# Patient Record
Sex: FEMALE | Race: WHITE | NOT HISPANIC OR LATINO | Employment: UNEMPLOYED | URBAN - METROPOLITAN AREA
[De-identification: names, ages, dates, MRNs, and addresses within clinical notes are randomized per-mention and may not be internally consistent; named-entity substitution may affect disease eponyms.]

---

## 2022-01-12 ENCOUNTER — TELEPHONE (OUTPATIENT)
Dept: FAMILY MEDICINE CLINIC | Facility: CLINIC | Age: 37
End: 2022-01-12

## 2022-01-12 NOTE — TELEPHONE ENCOUNTER
Left 2md detailed vm for pt -  A reminder about [employer] PCP change  I advised patient to contact  insurance company to change PCP to Dr Vela -  to [facility]  Advised to get name of person he  / she speaks with just in case we need to contact them back for any reason  Left message earlier :   Patient verbalized understanding and states he will call insurance company right away

## 2022-02-04 NOTE — TELEPHONE ENCOUNTER
Pt cx several appt with our practice - we left several vm messages  for pt to contact health insurance plan  to change PCP assignment - NP appt scheduled 2/16/22 with Dr Vela       Left detailed vm / reminder for pt 2/4/22 @12:06 pm

## 2022-05-24 ENCOUNTER — VBI (OUTPATIENT)
Dept: ADMINISTRATIVE | Facility: OTHER | Age: 37
End: 2022-05-24

## 2022-12-06 ENCOUNTER — OFFICE VISIT (OUTPATIENT)
Dept: FAMILY MEDICINE CLINIC | Facility: CLINIC | Age: 37
End: 2022-12-06

## 2022-12-06 VITALS
SYSTOLIC BLOOD PRESSURE: 122 MMHG | HEART RATE: 96 BPM | TEMPERATURE: 98.8 F | OXYGEN SATURATION: 98 % | DIASTOLIC BLOOD PRESSURE: 76 MMHG | BODY MASS INDEX: 49 KG/M2 | WEIGHT: 287 LBS | HEIGHT: 64 IN

## 2022-12-06 DIAGNOSIS — F90.9 ATTENTION DEFICIT HYPERACTIVITY DISORDER (ADHD), UNSPECIFIED ADHD TYPE: ICD-10-CM

## 2022-12-06 DIAGNOSIS — F31.70 BIPOLAR DISORDER IN FULL REMISSION, MOST RECENT EPISODE UNSPECIFIED TYPE (HCC): ICD-10-CM

## 2022-12-06 DIAGNOSIS — N73.1 CHRONIC PID (CHRONIC PELVIC INFLAMMATORY DISEASE): ICD-10-CM

## 2022-12-06 DIAGNOSIS — N64.4 BREAST PAIN: ICD-10-CM

## 2022-12-06 DIAGNOSIS — N94.9 GYNECOLOGICAL DISORDER: ICD-10-CM

## 2022-12-06 DIAGNOSIS — R63.5 WEIGHT GAIN: ICD-10-CM

## 2022-12-06 DIAGNOSIS — F41.9 ANXIETY AND DEPRESSION: ICD-10-CM

## 2022-12-06 DIAGNOSIS — Z20.5 EXPOSURE TO HEPATITIS C: ICD-10-CM

## 2022-12-06 DIAGNOSIS — F32.A ANXIETY AND DEPRESSION: ICD-10-CM

## 2022-12-06 DIAGNOSIS — R23.4 BREAST SKIN CHANGES: ICD-10-CM
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
## 2022-12-06 DIAGNOSIS — Z80.3 FAMILY HISTORY OF BREAST CANCER IN MOTHER: Primary | ICD-10-CM

## 2022-12-06 RX ORDER — PROMETHAZINE HCL 50 MG
50 TABLET ORAL DAILY
COMMUNITY
Start: 2022-11-16

## 2022-12-06 RX ORDER — ALPRAZOLAM 0.5 MG/1
0.5 TABLET ORAL 2 TIMES DAILY
COMMUNITY
Start: 2022-11-17

## 2022-12-06 RX ORDER — GABAPENTIN 800 MG/1
800 TABLET ORAL 4 TIMES DAILY
COMMUNITY
Start: 2022-11-17

## 2022-12-06 RX ORDER — BUPRENORPHINE AND NALOXONE 8; 2 MG/1; MG/1
8 FILM, SOLUBLE BUCCAL; SUBLINGUAL 2 TIMES DAILY
COMMUNITY
Start: 2022-11-16

## 2022-12-06 RX ORDER — AMOXICILLIN AND CLAVULANATE POTASSIUM 875; 125 MG/1; MG/1
1 TABLET, FILM COATED ORAL EVERY 12 HOURS SCHEDULED
Qty: 14 TABLET | Refills: 0 | Status: SHIPPED | OUTPATIENT
Start: 2022-12-06 | End: 2022-12-13

## 2022-12-06 NOTE — PATIENT INSTRUCTIONS
Calorie Counting Diet   WHAT YOU NEED TO KNOW:   What is a calorie counting diet? It is a meal plan based on counting calories each day to reach a healthy body weight  You will need to eat fewer calories if you are trying to lose weight  Weight loss may decrease your risk for certain health problems or improve your health if you have health problems  Some of these health problems include heart disease, high blood pressure, and diabetes  What foods should I avoid? Your dietitian will tell you if you need to avoid certain foods based on your body weight and health condition  You may need to avoid high-fat foods if you are at risk for or have heart disease  You may need to eat fewer foods from the breads and starches food group if you have diabetes  How many calories are in foods? The following is a list of foods and drinks with the approximate number of calories in each  Check the food label to find the exact number of calories  A dietitian can tell you how many calories you should have from each food group each day  · Carbohydrate:      ? ½ of a 3-inch bagel, 1 slice of bread, or ½ of a hamburger bun or hot dog bun (80)    ? 1 (8-inch) flour tortilla or ½ cup of cooked rice (100)    ? 1 (6-inch) corn tortilla (80)    ? 1 (6-inch) pancake or 1 cup of bran flakes cereal (110)    ? ½ cup of cooked cereal (80)    ? ½ cup of cooked pasta (85)    ? 1 ounce of pretzels (100)    ? 3 cups of air-popped popcorn without butter or oil (80)    · Dairy:      ? 1 cup of skim or 1% milk (90)    ? 1 cup of 2% milk (120)    ? 1 cup of whole milk (160)    ? 1 cup of 2% chocolate milk (220)    ? 1 ounce of low-fat cheese with 3 grams of fat per ounce (70)    ? 1 ounce of cheddar cheese (114)    ? ½ cup of 1% fat cottage cheese (80)    ? 1 cup of plain or sugar-free, fat-free yogurt (90)    · Protein foods:      ? 3 ounces of fish (not breaded or fried) (95)    ? 3 ounces of breaded, fried fish (195)    ?  ¾ cup of tuna canned in water (105)    ? 3 ounces of chicken breast without skin (105)    ? 1 fried chicken breast with skin (350)    ? ¼ cup of fat free egg substitute (40)    ? 1 large egg (75)    ? 3 ounces of lean beef or pork (165)    ? 3 ounces of fried pork chop or ham (185)    ? ½ cup of cooked dried beans, such as kidney, martin, lentils, or navy (115)    ? 3 ounces of bologna or lunch meat (225)    ? 2 links of breakfast sausage (140)    · Vegetables:      ? ½ cup of sliced mushrooms (10)    ? 1 cup of salad greens, such as lettuce, spinach, or tamara (15)    ? ½ cup of steamed asparagus (20)    ? ½ cup of cooked summer squash, zucchini squash, or green or wax beans (25)    ? 1 cup of broccoli or cauliflower florets, or 1 medium tomato (25)    ? 1 large raw carrot or ½ cup of cooked carrots (40)    ? ? of a medium cucumber or 1 stalk of celery (5)    ? 1 small baked potato (160)    ? 1 cup of breaded, fried vegetables (230)    · Fruit:      ? 1 (6-inch) banana (55)     ? ½ of a 4-inch grapefruit (55)    ? 15 grapes (60)    ? 1 medium orange or apple (70)    ? 1 large peach (65)    ? 1 cup of fresh pineapple chunks (75)    ? 1 cup of melon cubes (50)    ? 1¼ cups of whole strawberries (45)    ? ½ cup of fruit canned in juice (55)    ? ½ cup of fruit canned in heavy syrup (110)    ? ? cup of raisins (130)    ? ½ cup of unsweetened fruit juice (60)    ? ½ cup of grape, cranberry, or prune juice (90)    · Fat:      ? 10 peanuts or 2 teaspoons of peanut butter (55)    ? 2 tablespoons of avocado or 1 tablespoon of regular salad dressing (45)    ? 2 slices of loja (90)    ? 1 teaspoon of oil, such as safflower, canola, corn, or olive oil (45)    ? 2 teaspoons of low-fat margarine, or 1 tablespoon of low-fat mayonnaise (50)    ? 1 teaspoon of regular margarine (40)    ? 1 tablespoon of regular mayonnaise (135)    ? 1 tablespoon of cream cheese or 2 tablespoons of low-fat cream cheese (45)    ?  2 tablespoons of vegetable shortening (215)    · Dessert and sweets:      ? 8 animal crackers or 5 vanilla wafers (80)    ? 1 frozen fruit juice bar (80)    ? ½ cup of ice milk or low-fat frozen yogurt (90)    ? ½ cup of sherbet or sorbet (125)    ? ½ cup of sugar-free pudding or custard (60)    ? ½ cup of ice cream (140)    ? ½ cup of pudding or custard (175)    ? 1 (2-inch) square chocolate brownie (185)    · Combination foods:      ? Bean burrito made with an 8-inch tortilla, without cheese (275)    ? Chicken breast sandwich with lettuce and tomato (325)    ? 1 cup of chicken noodle soup (60)    ? 1 beef taco (175)    ? Regular hamburger with lettuce and tomato (310)    ? Regular cheeseburger with lettuce and tomato (410)     ? ¼ of a 12-inch cheese pizza (280)    ? Fried fish sandwich with lettuce and tomato (425)    ? Hot dog and bun (275)    ? 1½ cups of macaroni and cheese (310)    ? Taco salad with a fried tortilla shell (870)    · Low-calorie foods:      ? 1 tablespoon of ketchup or 1 tablespoon of fat free sour cream (15)    ? 1 teaspoon of mustard (5)    ? ¼ cup of salsa (20)    ? 1 large dill pickle (15)    ? 1 tablespoon of fat free salad dressing (10)    ? 2 teaspoons of low-sugar, light jam or jelly, or 1 tablespoon of sugar-free syrup (15)    ? 1 sugar-free popsicle (15)    ? 1 cup of club soda, seltzer water, or diet soda (0)    CARE AGREEMENT:   You have the right to help plan your care  Discuss treatment options with your healthcare provider to decide what care you want to receive  You always have the right to refuse treatment  The above information is an  only  It is not intended as medical advice for individual conditions or treatments  Talk to your doctor, nurse or pharmacist before following any medical regimen to see if it is safe and effective for you  © Copyright [employer] 2022 Information is for End User's use only and may not be sold, redistributed or otherwise used for commercial purposes   All illustrations and images included in CareNotes® are the copyrighted property of A D A M [employer] Inc  or Alexander Johnson

## 2022-12-06 NOTE — PROGRESS NOTES
Assessment/Plan:         Problem List Items Addressed This Visit        Genitourinary    Chronic PID (chronic pelvic inflammatory disease)     Is not interested in seeing a gynecologist at this time         Relevant Medications    amoxicillin-clavulanate (AUGMENTIN) 875-125 mg per tablet    Gynecological disorder       Other    Family history of breast cancer in mother - Primary     We will get a breast ultrasound have her see genetic counselor         Relevant Orders    Ambulatory Referral to Oncology Genetics    US breast screening bilateral complete (ABUS)    Ambulatory Referral to Breast Surgery    Attention deficit hyperactivity disorder (ADHD)     Follows with psych         Relevant Medications    sertraline (ZOLOFT) 50 mg tablet    ALPRAZolam (XANAX) 0 5 mg tablet    Bipolar disorder in full remission ([facility] [address] )     Follows with psych and takes 800 of gabapentin 4x daily         Relevant Medications    sertraline (ZOLOFT) 50 mg tablet    ALPRAZolam (XANAX) 0 5 mg tablet    Anxiety and depression     Follows Dr Machelle Soler office and is stable on Zoloft and Xanax as needed         Relevant Medications    sertraline (ZOLOFT) 50 mg tablet    ALPRAZolam (XANAX) 0 5 mg tablet    Exposure to hepatitis C     States she was weakly positive for follow-up lab work and confirmatory antibody         Relevant Orders    Hepatitis C antibody    HIV 1/2 Antigen/Antibody (4th Generation) w Reflex [facility]    Hepatitis C antibody    BMI 45 0-49 9, adult ([facility] [address] )    Relevant Orders    Comprehensive metabolic panel    CBC and differential    Lipid panel    TSH, 3rd generation with Free T4 reflex    Weight gain   Other Visit Diagnoses     Breast pain        Relevant Orders    US breast screening bilateral complete (ABUS)    Ambulatory Referral to Breast Surgery    Cat bite, initial encounter        Relevant Medications    amoxicillin-clavulanate (AUGMENTIN) 875-125 mg per tablet    Other Relevant Orders    TDAP VACCINE GREATER THAN OR EQUAL TO 6YO IM (Completed)    Breast skin changes        Relevant Orders    Ambulatory Referral to Breast Surgery        Tdap and antibiotic for the cat scratch    Portions of the record may have been created with voice recognition software  Occasional wrong word or "sound a like" substitutions may have occurred due to the inherent limitations of voice recognition software  Read the chart carefully and recognize, using context, where substitutions have occurred  Subjective:      Patient ID: Jd Palumbo is a 40 y o  female  HPI    1 year of bad electric pain in her breast, like they are suffocating R is worse than left (also R handed)  Not related to menstrual cycle  Has noticed some skin changes  No lactation or LOF from nipple    Has hx of endometriosis and pelvic dysfunction, chronic PID, recurrent ovarian cyst rupturing  Denies a hx of STD's  Saw multiple specialist for this without any concrete answers  Had issues with pain and then went on suboxone to help with pain  Does not have sex now because of the pain  Did have some addition concerns with pain meds so transitioned to suboxone  Psych: on xanax and zoloft  Diagnoses with BP type 2 as well  Was scratched by her cat on her right breast and skin has been very red and irritated    The following portions of the patient's history were reviewed and updated as appropriate:   Past Medical History:  She has a past medical history of ADHD, Anxiety, Bipolar 1 disorder ([address] ), Depression, and PTSD (post-traumatic stress disorder)  ,  _______________________________________________________________________  Medical Problems:  does not have any pertinent problems on file ,  _______________________________________________________________________  Past Surgical History:   has no past surgical history on file ,  _______________________________________________________________________  Family History:  family history includes Breast cancer in her cousin and mother; COPD in her mother; Heart attack in her father; Lung cancer in her paternal uncle; Mental illness in her brother; Pancreatic cancer in her maternal aunt  ,  _______________________________________________________________________  Social History:   reports that she has never smoked  She has never used smokeless tobacco  She reports that she does not currently use alcohol  She reports that she does not currently use drugs  ,  _______________________________________________________________________  Allergies:  is allergic to doxycycline and penicillin v   _______________________________________________________________________  Current Outpatient Medications   Medication Sig Dispense Refill   • amoxicillin-clavulanate (AUGMENTIN) 875-125 mg per tablet Take 1 tablet by mouth every 12 (twelve) hours for 7 days 14 tablet 0   • ALPRAZolam (XANAX) 0 5 mg tablet Take 0 5 mg by mouth 2 (two) times a day     • buprenorphine-naloxone (Suboxone) 8-2 mg Place 8 mg under the tongue 2 (two) times a day     • gabapentin (NEURONTIN) 800 mg tablet Take 800 mg by mouth 4 (four) times a day     • promethazine (PHENERGAN) 50 MG tablet Take 50 mg by mouth in the morning     • sertraline (ZOLOFT) 50 mg tablet Take 50 mg by mouth in the morning       No current facility-administered medications for this visit      _______________________________________________________________________  Review of Systems   Constitutional: Positive for unexpected weight change  Negative for chills, fatigue and fever  HENT: Negative for rhinorrhea and sore throat  Eyes: Negative for visual disturbance  Respiratory: Negative for cough and shortness of breath  Cardiovascular: Positive for chest pain  Negative for palpitations  Gastrointestinal: Negative for abdominal pain, constipation, diarrhea, nausea and vomiting  Genitourinary: Positive for pelvic pain  Negative for difficulty urinating, dysuria and frequency     Musculoskeletal: Negative for arthralgias and myalgias  Skin: Positive for wound  Negative for color change and rash  Neurological: Negative for weakness and headaches  Psychiatric/Behavioral: Positive for behavioral problems  Objective:  Vitals:    12/06/22 0902   BP: 122/76   BP Location: Left arm   Patient Position: Sitting   Cuff Size: Standard   Pulse: 96   Temp: 98 8 °F (37 1 °C)   SpO2: 98%   Weight: 130 kg (287 lb)   Height: 5' 4" (1 626 m)     Body mass index is 49 26 kg/m²  Physical Exam  Constitutional:       General: She is not in acute distress  Appearance: Normal appearance  She is obese  She is not ill-appearing  HENT:      Head: Normocephalic and atraumatic  Right Ear: External ear normal       Left Ear: External ear normal       Nose: Nose normal  No congestion or rhinorrhea  Mouth/Throat:      Mouth: Mucous membranes are moist       Pharynx: Oropharynx is clear  No oropharyngeal exudate or posterior oropharyngeal erythema  Eyes:      Extraocular Movements: Extraocular movements intact  Conjunctiva/sclera: Conjunctivae normal       Pupils: Pupils are equal, round, and reactive to light  Cardiovascular:      Rate and Rhythm: Normal rate and regular rhythm  Pulses: Normal pulses  Heart sounds: No murmur heard  Pulmonary:      Effort: Pulmonary effort is normal  No respiratory distress  Breath sounds: Normal breath sounds  No wheezing  Chest:      Chest wall: No tenderness  Breasts:     Right: Skin change present  No swelling, bleeding, inverted nipple, mass, nipple discharge or tenderness  Left: No bleeding, inverted nipple, mass, nipple discharge, skin change or tenderness  Comments: Glandular changes in both breast, in R breast at 4 oclock position with nipple  In left breast in the 9 oclock position  Abdominal:      General: Bowel sounds are normal       Palpations: Abdomen is soft  Tenderness: There is no abdominal tenderness     Musculoskeletal: General: Normal range of motion  Cervical back: Normal range of motion  Lymphadenopathy:      Upper Body:      Right upper body: No supraclavicular, axillary or pectoral adenopathy  Left upper body: No supraclavicular, axillary or pectoral adenopathy  Skin:     General: Skin is warm and dry  Capillary Refill: Capillary refill takes less than 2 seconds  Findings: No rash  Neurological:      General: No focal deficit present  Mental Status: She is alert  Mental status is at baseline  Depression Screening Follow-up Plan: Patient's depression screening was positive with a PHQ-2 score of 5  Their PHQ-9 score was 19  Continue regular follow-up with their psychologist/therapist/psychiatrist who is managing their mental health condition(s)  BMI Counseling: Body mass index is 49 26 kg/m²  The BMI is above normal  Nutrition recommendations include reducing portion sizes, 3-5 servings of fruits/vegetables daily, consuming healthier snacks, moderation in carbohydrate intake and reducing intake of cholesterol  Exercise recommendations include moderate aerobic physical activity for 150 minutes/week and exercising 3-5 times per week

## 2022-12-08 ENCOUNTER — TELEPHONE (OUTPATIENT)
Dept: GENETICS | Facility: CLINIC | Age: 37
End: 2022-12-08

## 2022-12-08 NOTE — TELEPHONE ENCOUNTER
Kendy Miller called back to schedule a new patient appointment with the Cancer Risk and Genetics Program       Outcome:  Genetics appointment scheduled for April 18, 2023  Patient was added to the wait list    Personal/Family History Related to Appointment:  No Personal hx of cancer  Family hx of breast ca(mom), lung ca(p uncle) pancreatic ca(m aunt), skin ca(m uncle)  Patient is non-Yazidism  History of Genetic Testing:  Patient reports family history of genetic testing  BRCA testing (mom) negative  Unsure if able to bring copy of results to appointment  Genetics Family History Questionnaire: The patient provided an alternative e-mail to send an invite link for our genetics family history intake: [email or web address]

## 2022-12-08 NOTE — TELEPHONE ENCOUNTER
I called Moris Morgan to schedule a new patient appointment with the Cancer Risk and Genetics Program       Outcome:   I left a voice message encouraging the patient to call the genetics team at [phone] to schedule this appointment  Follow-up:   At this time the referral will be closed and we will wait to hear back from the patient regarding scheduling this appointment

## 2022-12-21 ENCOUNTER — VBI (OUTPATIENT)
Dept: ADMINISTRATIVE | Facility: OTHER | Age: 37
End: 2022-12-21

## 2023-01-11 ENCOUNTER — TELEPHONE (OUTPATIENT)
Dept: PSYCHIATRY | Facility: CLINIC | Age: 38
End: 2023-01-11

## 2023-01-11 NOTE — TELEPHONE ENCOUNTER
Gurdeepbritt Briseyda  requested a call back to discuss Pt stated she used to be a pt of the provider and wants to speak with her in regards to her current medication shes on for 13 years  Pt stated they want to take her off of it and shes scared  She was a former pt  They can be reached at P# [phone] or [phone]      Thank you

## 2023-01-12 ENCOUNTER — TELEPHONE (OUTPATIENT)
Dept: PSYCHIATRY | Facility: CLINIC | Age: 38
End: 2023-01-12

## 2023-01-12 NOTE — TELEPHONE ENCOUNTER
Ok Thank you! I was not sure if you would be able to speak with her even if she was a previous pt   I will call her and have her go through intake and be placed on the wait list

## 2023-01-12 NOTE — TELEPHONE ENCOUNTER
Pt called in yesterday wanting to speak with a previous provider  The provider declined to speak with the pt due to time gap etc  Writer tried to call the pt back but had to leave a message for her to call the office and be placed on the wait list if shes interested in services

## 2023-01-19 ENCOUNTER — DOCUMENTATION (OUTPATIENT)
Dept: GENETICS | Facility: CLINIC | Age: 38
End: 2023-01-19

## 2023-06-20 ENCOUNTER — TELEPHONE (OUTPATIENT)
Dept: OBGYN CLINIC | Facility: HOSPITAL | Age: 38
End: 2023-06-20

## 2023-06-20 NOTE — TELEPHONE ENCOUNTER
I received a Care Request from patient to schedule for:  Where Does it Hurt? Left knee front bone and swelling and pain on the sides and top front   Are you considering joint replacement? Yes   Are you seeking a second opinion? No  If yes, who is your doctor? Dr Kalina Shen         I lvm to [address] [address] at [phone]

## 2023-08-22 ENCOUNTER — VBI (OUTPATIENT)
Dept: ADMINISTRATIVE | Facility: OTHER | Age: 38
End: 2023-08-22

## 2024-03-26 ENCOUNTER — TELEPHONE (OUTPATIENT)
Dept: FAMILY MEDICINE CLINIC | Facility: CLINIC | Age: 39
End: 2024-03-26

## 2024-03-26 NOTE — TELEPHONE ENCOUNTER
Pt scheduled for a sick visit on Friday and doesn't have a car. Wondering if visit can be virtual.     Please advise

## 2024-03-29 ENCOUNTER — TELEMEDICINE (OUTPATIENT)
Dept: FAMILY MEDICINE CLINIC | Facility: CLINIC | Age: 39
End: 2024-03-29
Payer: COMMERCIAL

## 2024-03-29 VITALS — WEIGHT: 262 LBS | HEIGHT: 64 IN | BODY MASS INDEX: 44.73 KG/M2

## 2024-03-29 DIAGNOSIS — N63.0 BREAST LUMP IN FEMALE: ICD-10-CM

## 2024-03-29 DIAGNOSIS — J06.9 UPPER RESPIRATORY TRACT INFECTION, UNSPECIFIED TYPE: Primary | ICD-10-CM

## 2024-03-29 DIAGNOSIS — N63.0 FIBROUS BREAST LUMPS: ICD-10-CM

## 2024-03-29 DIAGNOSIS — Z20.5 EXPOSURE TO HEPATITIS C: ICD-10-CM

## 2024-03-29 DIAGNOSIS — R63.4 WEIGHT LOSS: ICD-10-CM

## 2024-03-29 PROCEDURE — 99214 OFFICE O/P EST MOD 30 MIN: CPT | Performed by: STUDENT IN AN ORGANIZED HEALTH CARE EDUCATION/TRAINING PROGRAM

## 2024-03-29 RX ORDER — AZITHROMYCIN 250 MG/1
TABLET, FILM COATED ORAL
Qty: 6 TABLET | Refills: 0 | Status: SHIPPED | OUTPATIENT
Start: 2024-03-29 | End: 2024-04-02

## 2024-03-29 RX ORDER — LISDEXAMFETAMINE DIMESYLATE 40 MG/1
40 CAPSULE ORAL EVERY MORNING
COMMUNITY
Start: 2024-03-13

## 2024-03-29 NOTE — PROGRESS NOTES
"Virtual Regular Visit    Verification of patient location:    Patient is located at Home in the following state in which I hold an active license [place]      Assessment/Plan:    Problem List Items Addressed This Visit        Other    Exposure to hepatitis C    Relevant Orders    Hepatitis C antibody    Hepatitis C RNA, Quantitative PCR, [facility]   Other Visit Diagnoses     Upper respiratory tract infection, unspecified type    -  Primary    Relevant Medications    azithromycin (ZITHROMAX) 250 mg tablet    methylPREDNISolone (MEDROL) 2 MG tablet    Weight loss        Relevant Orders    Hepatitis C antibody    HIV 1/2 AG/AB w Reflex [facility] for 2 yr old and above    Comprehensive metabolic panel    Hemoglobin A1C    TSH + Free T4    CBC (Includes Diff/Plt) (Refl)    Fibrous breast lumps        Breast lump in female        Relevant Orders    US breast left limited (diagnostic)    US breast right limited (diagnostic)                 Reason for visit is   Chief Complaint   Patient presents with   • URI     Onset 2 weeks. Sore throat, congestion, headache, eye swelling. Ear pain- with \"yellow stuff coming out\"    • Virtual Regular Visit          Encounter provider Kenzie Vela MD    Provider located at [address] [place] [address]  [phone]      Recent Visits  Date Type Provider Dept   03/26/24 Telephone Kenzie Vela MD [address]   Showing recent visits within past 7 days and meeting all other requirements  Today's Visits  Date Type Provider Dept   03/29/24 Telemedicine Kenzie Vela MD [address]   Showing today's visits and meeting all other requirements  Future Appointments  No visits were found meeting these conditions.  Showing future appointments within next 150 days and meeting all other requirements       The patient was identified by name and date of " birth. Mariana Messina was informed that this is a telemedicine visit and that the visit is being conducted through the Epic Embedded platform. She agrees to proceed..  My office door was closed. No one else was in the room.  She acknowledged consent and understanding of privacy and security of the video platform. The patient has agreed to participate and understands they can discontinue the visit at any time.    Patient is aware this is a billable service.     Subjective  Mariana Messina is a 38 y.o. female       HPI     Uri symptoms, eyes, ears for  a few weeks. Mom has been sick, brothger had flu.     Concerned about breast lumps, did not have mammogram or US (dneied by insurance per patient)    Concerned about weight loss, was told she may have hep c in the past. Ex partner had hep c. She never had treatment    Past Medical History:   Diagnosis Date   • ADHD    • Anxiety    • Bipolar 1 disorder (HCC)    • Depression    • PTSD (post-traumatic stress disorder)        History reviewed. No pertinent surgical history.    Current Outpatient Medications   Medication Sig Dispense Refill   • ALPRAZolam (XANAX) 0.5 mg tablet Take 0.5 mg by mouth 2 (two) times a day     • azithromycin (ZITHROMAX) 250 mg tablet Take 2 tablets today then 1 tablet daily x 4 days 6 tablet 0   • buprenorphine-naloxone (Suboxone) 8-2 mg Place 8 mg under the tongue 2 (two) times a day     • gabapentin (NEURONTIN) 800 mg tablet Take 800 mg by mouth 4 (four) times a day     • methylPREDNISolone (MEDROL) 2 MG tablet Take 1 tablet (2 mg total) by mouth daily for 5 days 10 tablet 0   • promethazine (PHENERGAN) 50 MG tablet Take 50 mg by mouth in the morning     • sertraline (ZOLOFT) 50 mg tablet Take 50 mg by mouth in the morning     • Vyvanse 40 MG capsule Take 40 mg by mouth every morning       No current facility-administered medications for this visit.        Allergies   Allergen Reactions   • Codeine Hives   • Doxycycline Hives   • Penicillin V Cough  "      Review of Systems   Constitutional:  Positive for activity change, appetite change, fatigue and unexpected weight change. Negative for fever.   HENT:  Positive for rhinorrhea and sore throat. Negative for congestion.    Eyes:  Negative for visual disturbance.   Respiratory:  Positive for cough. Negative for shortness of breath.    Cardiovascular:  Negative for chest pain.   Gastrointestinal:  Negative for nausea and vomiting.       Video Exam    Vitals:    03/29/24 1303   Weight: 119 kg (262 lb)   Height: 5' 4\" (1.626 m)       Physical Exam  Constitutional:       General: She is not in acute distress.     Appearance: Normal appearance. She is not ill-appearing.   Musculoskeletal:      Cervical back: Normal range of motion.   Neurological:      Mental Status: She is alert.          Visit Time  Total Visit Duration: 15        "

## 2024-04-18 ENCOUNTER — VBI (OUTPATIENT)
Dept: ADMINISTRATIVE | Facility: OTHER | Age: 39
End: 2024-04-18

## 2024-04-20 ENCOUNTER — APPOINTMENT (EMERGENCY)
Dept: CT IMAGING | Facility: HOSPITAL | Age: 39
End: 2024-04-20
Payer: COMMERCIAL

## 2024-04-20 ENCOUNTER — HOSPITAL ENCOUNTER (EMERGENCY)
Facility: HOSPITAL | Age: 39
Discharge: HOME | End: 2024-04-21
Attending: EMERGENCY MEDICINE
Payer: COMMERCIAL

## 2024-04-20 VITALS
DIASTOLIC BLOOD PRESSURE: 85 MMHG | RESPIRATION RATE: 20 BRPM | TEMPERATURE: 98.2 F | SYSTOLIC BLOOD PRESSURE: 141 MMHG | OXYGEN SATURATION: 99 % | HEART RATE: 87 BPM

## 2024-04-20 DIAGNOSIS — N20.0 NEPHROLITHIASIS: Primary | ICD-10-CM

## 2024-04-20 LAB
ALBUMIN SERPL BCP-MCNC: 4.1 G/DL (ref 3.5–5)
ALP SERPL-CCNC: 96 U/L (ref 34–104)
ALT SERPL W P-5'-P-CCNC: 32 U/L (ref 7–52)
ANION GAP SERPL CALCULATED.3IONS-SCNC: 8 MMOL/L (ref 4–13)
AST SERPL W P-5'-P-CCNC: 45 U/L (ref 13–39)
BACTERIA UR QL AUTO: ABNORMAL /HPF
BASOPHILS # BLD AUTO: 0.03 THOUSANDS/ÂΜL (ref 0–0.1)
BASOPHILS NFR BLD AUTO: 0 % (ref 0–1)
BILIRUB SERPL-MCNC: 0.65 MG/DL (ref 0.2–1)
BILIRUB UR QL STRIP: ABNORMAL
BUN SERPL-MCNC: 10 MG/DL (ref 5–25)
CALCIUM SERPL-MCNC: 9.7 MG/DL (ref 8.4–10.2)
CAOX CRY URNS QL MICRO: ABNORMAL /HPF
CHLORIDE SERPL-SCNC: 101 MMOL/L (ref 96–108)
CLARITY UR: ABNORMAL
CO2 SERPL-SCNC: 28 MMOL/L (ref 21–32)
COLOR UR: ABNORMAL
CREAT SERPL-MCNC: 0.78 MG/DL (ref 0.6–1.3)
EOSINOPHIL # BLD AUTO: 0.14 THOUSAND/ÂΜL (ref 0–0.61)
EOSINOPHIL NFR BLD AUTO: 2 % (ref 0–6)
ERYTHROCYTE [DISTWIDTH] IN BLOOD BY AUTOMATED COUNT: 12.8 % (ref 11.6–15.1)
EXT PREGNANCY TEST URINE: NEGATIVE
EXT. CONTROL: NORMAL
GFR SERPL CREATININE-BSD FRML MDRD: 96 ML/MIN/1.73SQ M
GLUCOSE SERPL-MCNC: 80 MG/DL (ref 65–140)
GLUCOSE UR STRIP-MCNC: NEGATIVE MG/DL
HCT VFR BLD AUTO: 44.9 % (ref 34.8–46.1)
HGB BLD-MCNC: 15.5 G/DL (ref 11.5–15.4)
HGB UR QL STRIP.AUTO: ABNORMAL
IMM GRANULOCYTES # BLD AUTO: 0.01 THOUSAND/UL (ref 0–0.2)
IMM GRANULOCYTES NFR BLD AUTO: 0 % (ref 0–2)
KETONES UR STRIP-MCNC: NEGATIVE MG/DL
LEUKOCYTE ESTERASE UR QL STRIP: NEGATIVE
LIPASE SERPL-CCNC: 7 U/L (ref 11–82)
LYMPHOCYTES # BLD AUTO: 2.11 THOUSANDS/ÂΜL (ref 0.6–4.47)
LYMPHOCYTES NFR BLD AUTO: 29 % (ref 14–44)
MCH RBC QN AUTO: 30.5 PG (ref 26.8–34.3)
MCHC RBC AUTO-ENTMCNC: 34.5 G/DL (ref 31.4–37.4)
MCV RBC AUTO: 88 FL (ref 82–98)
MONOCYTES # BLD AUTO: 0.41 THOUSAND/ÂΜL (ref 0.17–1.22)
MONOCYTES NFR BLD AUTO: 6 % (ref 4–12)
NEUTROPHILS # BLD AUTO: 4.64 THOUSANDS/ÂΜL (ref 1.85–7.62)
NEUTS SEG NFR BLD AUTO: 63 % (ref 43–75)
NITRITE UR QL STRIP: NEGATIVE
NON-SQ EPI CELLS URNS QL MICRO: ABNORMAL /HPF
NRBC BLD AUTO-RTO: 0 /100 WBCS
PH UR STRIP.AUTO: 5.5 [PH]
PLATELET # BLD AUTO: 227 THOUSANDS/UL (ref 149–390)
PMV BLD AUTO: 10.8 FL (ref 8.9–12.7)
POTASSIUM SERPL-SCNC: 4 MMOL/L (ref 3.5–5.3)
PROT SERPL-MCNC: 7.9 G/DL (ref 6.4–8.4)
PROT UR STRIP-MCNC: ABNORMAL MG/DL
RBC # BLD AUTO: 5.09 MILLION/UL (ref 3.81–5.12)
RBC #/AREA URNS AUTO: ABNORMAL /HPF
SODIUM SERPL-SCNC: 137 MMOL/L (ref 135–147)
SP GR UR STRIP.AUTO: >=1.03 (ref 1–1.03)
UROBILINOGEN UR QL STRIP.AUTO: 0.2 E.U./DL
WBC # BLD AUTO: 7.34 THOUSAND/UL (ref 4.31–10.16)
WBC #/AREA URNS AUTO: ABNORMAL /HPF

## 2024-04-20 PROCEDURE — 99284 EMERGENCY DEPT VISIT MOD MDM: CPT

## 2024-04-20 PROCEDURE — 74177 CT ABD & PELVIS W/CONTRAST: CPT

## 2024-04-20 PROCEDURE — 80053 COMPREHEN METABOLIC PANEL: CPT

## 2024-04-20 PROCEDURE — 36415 COLL VENOUS BLD VENIPUNCTURE: CPT

## 2024-04-20 PROCEDURE — 96374 THER/PROPH/DIAG INJ IV PUSH: CPT

## 2024-04-20 PROCEDURE — 81001 URINALYSIS AUTO W/SCOPE: CPT

## 2024-04-20 PROCEDURE — 81025 URINE PREGNANCY TEST: CPT

## 2024-04-20 PROCEDURE — 83690 ASSAY OF LIPASE: CPT

## 2024-04-20 PROCEDURE — 85025 COMPLETE CBC W/AUTO DIFF WBC: CPT

## 2024-04-20 PROCEDURE — 96361 HYDRATE IV INFUSION ADD-ON: CPT

## 2024-04-20 RX ORDER — KETOROLAC TROMETHAMINE 30 MG/ML
15 INJECTION, SOLUTION INTRAMUSCULAR; INTRAVENOUS ONCE
Status: COMPLETED | OUTPATIENT
Start: 2024-04-20 | End: 2024-04-20

## 2024-04-20 RX ADMIN — KETOROLAC TROMETHAMINE 15 MG: 30 INJECTION, SOLUTION INTRAMUSCULAR; INTRAVENOUS at 22:57

## 2024-04-20 RX ADMIN — SODIUM CHLORIDE 1000 ML: 0.9 INJECTION, SOLUTION INTRAVENOUS at 22:40

## 2024-04-21 RX ORDER — IBUPROFEN 800 MG/1
800 TABLET ORAL 3 TIMES DAILY
Qty: 21 TABLET | Refills: 0 | Status: SHIPPED | OUTPATIENT
Start: 2024-04-21 | End: 2024-04-23

## 2024-04-21 RX ORDER — TAMSULOSIN HYDROCHLORIDE 0.4 MG/1
0.4 CAPSULE ORAL
Qty: 5 CAPSULE | Refills: 0 | Status: SHIPPED | OUTPATIENT
Start: 2024-04-21 | End: 2024-04-23 | Stop reason: SDUPTHER

## 2024-04-21 RX ADMIN — IOHEXOL 100 ML: 350 INJECTION, SOLUTION INTRAVENOUS at 00:07

## 2024-04-21 NOTE — DISCHARGE INSTRUCTIONS
Follow-up with urology.  Take medicine as directed.  If any symptoms worsen or new symptoms appear return to the ER.

## 2024-04-21 NOTE — ED PROVIDER NOTES
History  Chief Complaint   Patient presents with    Flank Pain     Pt started with bad L flank and lower abd pain that started 2 days ago. Pt has a hx of ovarian cysts and states that this feels a lot like that. Pt also complains of constipation and dark urine for the past couple weeks     The patient is a 38 y.o. female with a history of ADHD, anxiety, bipolar 1, depression, PTSD, interstitial cystitis who presents to [facility] with a chief complaint of left flank pain. Symptoms began 2 days ago and have been progressively worsening since onset. Her pain is currently rated as a 7/10 in severity and described as sharp with radiation around the lower abdomen. Associated symptoms include dark urine and nasuea. Symptoms are aggravated with none noted and alleviating factors include none noted. The patient denies fever, chills, night sweats, chest pain, shortness of breath, falls, trauma, diarrhea, constipation, syncope. No other reported symptoms at this time.  Patient affirms allergies to codeine, doxycycline, penicillin V  Patient reports that she has been on Suboxone for around 10 years.  Patient also endorses a history of kidney stones.          History provided by:  Patient   used: No    Flank Pain  Associated symptoms: no chest pain, no chills, no cough, no dysuria, no fever, no hematuria, no shortness of breath, no sore throat and no vomiting        Prior to Admission Medications   Prescriptions Last Dose Informant Patient Reported? Taking?   ALPRAZolam (XANAX) 0.5 mg tablet   Yes No   Sig: Take 0.5 mg by mouth 2 (two) times a day   Vyvanse 40 MG capsule   Yes No   Sig: Take 40 mg by mouth every morning   buprenorphine-naloxone (Suboxone) 8-2 mg   Yes No   Sig: Place 8 mg under the tongue 2 (two) times a day   gabapentin (NEURONTIN) 800 mg tablet   Yes No   Sig: Take 800 mg by mouth 4 (four) times a day   promethazine (PHENERGAN) 50 MG tablet   Yes No   Sig: Take 50 mg by  mouth in the morning   sertraline (ZOLOFT) 50 mg tablet   Yes No   Sig: Take 50 mg by mouth in the morning      Facility-Administered Medications: None       Past Medical History:   Diagnosis Date    ADHD     Anxiety     Bipolar 1 disorder (HCC)     Depression     PTSD (post-traumatic stress disorder)        History reviewed. No pertinent surgical history.    Family History   Problem Relation Age of Onset    Breast cancer Mother     COPD Mother     Heart attack Father     Mental illness Brother     Pancreatic cancer Maternal Aunt     Lung cancer Paternal Uncle     Breast cancer Cousin      I have reviewed and agree with the history as documented.    E-Cigarette/Vaping    E-Cigarette Use Current Every Day User      E-Cigarette/Vaping Substances    Nicotine Yes     THC No     CBD No     Flavoring No     Other No     Unknown No      Social History     Tobacco Use    Smoking status: Never    Smokeless tobacco: Never   Vaping Use    Vaping status: Every Day    Substances: Nicotine   Substance Use Topics    Alcohol use: Not Currently    Drug use: Not Currently       Review of Systems   Constitutional:  Negative for chills and fever.   HENT:  Negative for ear pain and sore throat.    Eyes:  Negative for pain and visual disturbance.   Respiratory:  Negative for cough and shortness of breath.    Cardiovascular:  Negative for chest pain and palpitations.   Gastrointestinal:  Negative for abdominal pain and vomiting.   Genitourinary:  Positive for difficulty urinating and flank pain. Negative for dysuria and hematuria.   Musculoskeletal:  Negative for arthralgias and back pain.   Skin:  Negative for color change and rash.   Neurological:  Negative for seizures and syncope.   All other systems reviewed and are negative.      Physical Exam  Physical Exam  Vitals reviewed.   Constitutional:       General: She is not in acute distress.     Appearance: Normal appearance. She is not ill-appearing.   HENT:      Head: Normocephalic  and atraumatic.   Cardiovascular:      Rate and Rhythm: Normal rate.      Pulses: Normal pulses.   Pulmonary:      Effort: Pulmonary effort is normal. No respiratory distress.      Breath sounds: No stridor. No wheezing or rhonchi.   Abdominal:      General: Abdomen is protuberant. Bowel sounds are normal.      Palpations: Abdomen is soft.      Tenderness: There is abdominal tenderness in the left lower quadrant. There is left CVA tenderness. There is no right CVA tenderness, guarding or rebound. Negative signs include Beckman's sign, Rovsing's sign, McBurney's sign and psoas sign.   Skin:     General: Skin is warm and dry.      Capillary Refill: Capillary refill takes less than 2 seconds.      Coloration: Skin is not jaundiced.   Neurological:      Mental Status: She is alert and oriented to person, place, and time. Mental status is at baseline.         Vital Signs  ED Triage Vitals   Temperature Pulse Respirations Blood Pressure SpO2   04/20/24 2215 04/20/24 2210 04/20/24 2210 04/20/24 2210 04/20/24 2210   98.2 °F (36.8 °C) 87 20 141/85 99 %      Temp Source Heart Rate Source Patient Position - Orthostatic VS BP Location FiO2 (%)   04/20/24 2215 04/20/24 2210 04/20/24 2210 04/20/24 2210 --   Oral Monitor Sitting Left arm       Pain Score       04/20/24 2210       10 - Worst Possible Pain           Vitals:    04/20/24 2210   BP: 141/85   Pulse: 87   Patient Position - Orthostatic VS: Sitting         Visual Acuity      ED Medications  Medications   sodium chloride 0.9 % bolus 1,000 mL (0 mL Intravenous Stopped 4/21/24 0009)   ketorolac (TORADOL) injection 15 mg (15 mg Intravenous Given 4/20/24 2257)   iohexol (OMNIPAQUE) 350 MG/ML injection (MULTI-DOSE) 100 mL (100 mL Intravenous Given 4/21/24 0007)       Diagnostic Studies  Results Reviewed       Procedure Component Value Units Date/Time    Comprehensive metabolic panel [[id number]]  (Abnormal) Collected: 04/20/24 2238    Lab Status: Final result Specimen: Blood from  Arm, Right Updated: 04/20/24 2342     Sodium 137 mmol/L      Potassium 4.0 mmol/L      Chloride 101 mmol/L      CO2 28 mmol/L      ANION GAP 8 mmol/L      BUN 10 mg/dL      Creatinine 0.78 mg/dL      Glucose 80 mg/dL      Calcium 9.7 mg/dL      AST 45 U/L      ALT 32 U/L      Alkaline Phosphatase 96 U/L      Total Protein 7.9 g/dL      Albumin 4.1 g/dL      Total Bilirubin 0.65 mg/dL      eGFR 96 ml/min/1.73sq m     Narrative:      National Kidney Disease Foundation guidelines for Chronic Kidney Disease (CKD):     Stage 1 with normal or high GFR (GFR > 90 mL/min/1.73 square meters)    Stage 2 Mild CKD (GFR = 60-89 mL/min/1.73 square meters)    Stage 3A Moderate CKD (GFR = 45-59 mL/min/1.73 square meters)    Stage 3B Moderate CKD (GFR = 30-44 mL/min/1.73 square meters)    Stage 4 Severe CKD (GFR = 15-29 mL/min/1.73 square meters)    Stage 5 End Stage CKD (GFR <15 mL/min/1.73 square meters)  Note: GFR calculation is accurate only with a steady state creatinine    Lipase [[id number]]  (Abnormal) Collected: 04/20/24 2238    Lab Status: Final result Specimen: Blood from Arm, Right Updated: 04/20/24 2342     Lipase 7 u/L     Urine Microscopic [[id number]]  (Abnormal) Collected: 04/20/24 2239    Lab Status: Final result Specimen: Urine, Other Updated: 04/20/24 2316     RBC, UA 30-50 /hpf      WBC, UA 2-4 /hpf      Epithelial Cells Moderate /hpf      Bacteria, UA Occasional /hpf      Ca Oxalate Stephanie, UA Occasional /hpf     UA w Reflex to Microscopic w Reflex to Culture [[id number]]  (Abnormal) Collected: 04/20/24 2239    Lab Status: Final result Specimen: Urine, Other Updated: 04/20/24 2315     Color, UA Brown     Clarity, UA Cloudy     Specific Gravity, UA >=1.030     pH, UA 5.5     Leukocytes, UA Negative     Nitrite, UA Negative     Protein,  (2+) mg/dl      Glucose, UA Negative mg/dl      Ketones, UA Negative mg/dl      Urobilinogen, UA 0.2 E.U./dl      Bilirubin, UA Small     Occult Blood, UA Large    CBC and  differential [[id number]]  (Abnormal) Collected: 04/20/24 2238    Lab Status: Final result Specimen: Blood from Arm, Right Updated: 04/20/24 2305     WBC 7.34 Thousand/uL      RBC 5.09 Million/uL      Hemoglobin 15.5 g/dL      Hematocrit 44.9 %      MCV 88 fL      MCH 30.5 pg      MCHC 34.5 g/dL      RDW 12.8 %      MPV 10.8 fL      Platelets 227 Thousands/uL      nRBC 0 /100 WBCs      Segmented % 63 %      Immature Grans % 0 %      Lymphocytes % 29 %      Monocytes % 6 %      Eosinophils Relative 2 %      Basophils Relative 0 %      Absolute Neutrophils 4.64 Thousands/µL      Absolute Immature Grans 0.01 Thousand/uL      Absolute Lymphocytes 2.11 Thousands/µL      Absolute Monocytes 0.41 Thousand/µL      Eosinophils Absolute 0.14 Thousand/µL      Basophils Absolute 0.03 Thousands/µL     POCT pregnancy, urine [[id number]]  (Normal) Resulted: 04/20/24 2252    Lab Status: Final result Updated: 04/20/24 2252     EXT Preg Test, Ur Negative     Control Valid                   CT abdomen pelvis with contrast    (Results Pending)              Procedures  Procedures         ED Course  ED Course as of 04/21/24 0632   Sat Apr 20, 2024 2252 PREGNANCY TEST URINE: Negative   Sun Apr 21, 2024   0108 CT abdomen pelvis with contrast                               SBIRT 22yo+      Flowsheet Row Most Recent Value   Initial Alcohol Screen: US AUDIT-C     1. How often do you have a drink containing alcohol? 0 Filed at: 04/20/2024 2212   2. How many drinks containing alcohol do you have on a typical day you are drinking?  0 Filed at: 04/20/2024 2212   3b. FEMALE Any Age, or MALE 65+: How often do you have 4 or more drinks on one occassion? 0 Filed at: 04/20/2024 2212   Audit-C Score 0 Filed at: 04/20/2024 2212   RAMIRO: How many times in the past year have you...    Used an illegal drug or used a prescription medication for non-medical reasons? Never Filed at: 04/20/2024 2212                      Medical Decision Making  Patient presents  with flank pain likely secondary to renal colic from likely non infected kidney stone. Given history, exam, and work up I have low suspicion for atypical appendicitis, genital torsion, acute cholecystitis, AAA, infected obstructed stone, pyelonephritis, or other emergent intraabdominal pathology. Symptoms and UA indicate no infection. BMP witohut evidence of RANI. Pain treated in ED with toradol. Discussed case with urology who recommended pain medicine and tamsulosin with expedited follow up. Patient appropriate for discharge with outpatient follow-up and ibuprofen for pain. Prior to discharge, discharge instructions were discussed with patient at bedside. Patient was provided both verbal and written instructions. Patient is understanding of the discharge instructions and is agreeable to plan of care. Return precautions were discussed with patient bedside, patient verbalized understanding of signs and symptoms that would necessitate return to the ED. All questions were answered. Patient was comfortable with the plan of care and discharged to home.       Problems Addressed:  Nephrolithiasis: acute illness or injury    Amount and/or Complexity of Data Reviewed  Labs: ordered. Decision-making details documented in ED Course.  Radiology: ordered. Decision-making details documented in ED Course.    Risk  Prescription drug management.             Disposition  Final diagnoses:   Nephrolithiasis     Time reflects when diagnosis was documented in both MDM as applicable and the Disposition within this note       Time User Action Codes Description Comment    4/21/2024  2:13 AM Donnie Agee Add [N20.0] Nephrolithiasis           ED Disposition       ED Disposition   Discharge    Condition   Stable    Date/Time   Sun Apr 21, 2024 0214    Comment   Mariana Messina discharge to home/self care.                   Follow-up Information       Follow up With Specialties Details Why Contact Info Additional Information    Kenzie Vela,  MD Family Medicine Schedule an appointment as soon as possible for a visit   [address] [place] [address]  [address]  [place] [address]  [phone]       [facility] Emergency Medicine  If symptoms worsen [address] [facility] [address]  [phone] [facility], [address] [place], [place], [address]            Discharge Medication List as of 4/21/2024  2:29 AM        START taking these medications    Details   ibuprofen (MOTRIN) 800 mg tablet Take 1 tablet (800 mg total) by mouth 3 (three) times a day, Starting Sun 4/21/2024, Normal      tamsulosin (FLOMAX) 0.4 mg Take 1 capsule (0.4 mg total) by mouth daily with dinner, Starting Sun 4/21/2024, Normal           CONTINUE these medications which have NOT CHANGED    Details   ALPRAZolam (XANAX) 0.5 mg tablet Take 0.5 mg by mouth 2 (two) times a day, Starting Thu 11/17/2022, Historical Med      buprenorphine-naloxone (Suboxone) 8-2 mg Place 8 mg under the tongue 2 (two) times a day, Starting Wed 11/16/2022, Historical Med      gabapentin (NEURONTIN) 800 mg tablet Take 800 mg by mouth 4 (four) times a day, Starting Thu 11/17/2022, Historical Med      promethazine (PHENERGAN) 50 MG tablet Take 50 mg by mouth in the morning, Starting Wed 11/16/2022, Historical Med      sertraline (ZOLOFT) 50 mg tablet Take 50 mg by mouth in the morning, Starting Sat 11/5/2022, Historical Med      Vyvanse 40 MG capsule Take 40 mg by mouth every morning, Starting Wed 3/13/2024, Historical Med                 PDMP Review         Value Time User    PDMP Reviewed  Yes 3/29/2024  2:08 PM Kenzie Vela MD            ED Provider  Electronically Signed by             Donnie Agee PA-C  04/21/24 0632

## 2024-04-21 NOTE — ED NOTES
Report received from previous shift. Pt has been medicated and is awaiting CT scan.      Robyn Sheth RN  04/20/24 2549

## 2024-04-22 ENCOUNTER — TELEPHONE (OUTPATIENT)
Dept: FAMILY MEDICINE CLINIC | Facility: CLINIC | Age: 39
End: 2024-04-22

## 2024-04-22 ENCOUNTER — TELEPHONE (OUTPATIENT)
Age: 39
End: 2024-04-22

## 2024-04-22 NOTE — TELEPHONE ENCOUNTER
Patient has never been seen by office. New patient appt scheduled this week.     Attempted to call patient but there was no answer so a generic VM was left     When patient calls back please inform her we cannot provide prescriptions for patients who are not yet established. If patient is in severe pain she will need to call PCP or proceed back to ER

## 2024-04-22 NOTE — TELEPHONE ENCOUNTER
Pt called and scheduled a NP appt for kidney stone on Thursday 4/25     Pt is in severe pain and requested pain med until her appt    Pt stated that ibuprofen is not helping much.       Pt had CT scan that showed: secondary to an obstructing 4 mm left mid ureteral stone located at the L3-4 level. Additional 2 mm nonobstructing stone in the lower pole of the left kidney is   also seen        Please review

## 2024-04-22 NOTE — TELEPHONE ENCOUNTER
T/c from pt -- states she was in the hospital over the weekend. Was told to follow up as soon as possible with her PCP. Pt is scheduled for tomorrow, but is asking if this can be virtual due to her not having transportation.     Please advise

## 2024-04-23 ENCOUNTER — TELEMEDICINE (OUTPATIENT)
Dept: FAMILY MEDICINE CLINIC | Facility: CLINIC | Age: 39
End: 2024-04-23
Payer: COMMERCIAL

## 2024-04-23 VITALS — BODY MASS INDEX: 43.36 KG/M2 | WEIGHT: 254 LBS | HEIGHT: 64 IN

## 2024-04-23 DIAGNOSIS — N20.0 NEPHROLITHIASIS: ICD-10-CM

## 2024-04-23 PROCEDURE — 99214 OFFICE O/P EST MOD 30 MIN: CPT | Performed by: STUDENT IN AN ORGANIZED HEALTH CARE EDUCATION/TRAINING PROGRAM

## 2024-04-23 RX ORDER — TAMSULOSIN HYDROCHLORIDE 0.4 MG/1
0.4 CAPSULE ORAL
Qty: 30 CAPSULE | Refills: 0 | Status: SHIPPED | OUTPATIENT
Start: 2024-04-23

## 2024-04-23 NOTE — TELEPHONE ENCOUNTER
Spoke to pt who states she is still in severe pain.  Advised that no meds can be prescribed until she is established as a pt.  New pt Appt scheduled for 4/25/24.  Pt was advised to go to ER

## 2024-04-23 NOTE — PROGRESS NOTES
Virtual Regular Visit    Verification of patient location:    Patient is located at Home in the following state in which I hold an active license [place]      Assessment/Plan:    Problem List Items Addressed This Visit    None  Visit Diagnoses     Nephrolithiasis        Relevant Medications    diclofenac sodium (VOLTAREN) 50 mg EC tablet    tamsulosin (FLOMAX) 0.4 mg        Recommend nsaid over additional narctoic. Flomax also sent in, has not picked them up yet. Reviewed ER note    Reason for visit is   Chief Complaint   Patient presents with   • Follow-up     ED f/u.   • Virtual Regular Visit          Encounter provider Kenzie Vela MD    Provider located at [address]  [address] [place] [id number] [phone]      Recent Visits  Date Type Provider Dept   04/22/24 Telephone Kenzie Vela MD [facility] [address] [place] [address]   Showing recent visits within past 7 days and meeting all other requirements  Today's Visits  Date Type Provider Dept   04/23/24 Telemedicine Kenzie Vela MD [facility] [address] [place] [address]   Showing today's visits and meeting all other requirements  Future Appointments  No visits were found meeting these conditions.  Showing future appointments within next 150 days and meeting all other requirements       The patient was identified by name and date of birth. Mariana Messina was informed that this is a telemedicine visit and that the visit is being conducted through the Epic Embedded platform. She agrees to proceed..  My office door was closed. No one else was in the room.  She acknowledged consent and understanding of privacy and security of the video platform. The patient has agreed to participate and understands they can discontinue the visit at any time.    Patient is aware this is a billable service.     Subjective  Mariana Messina is a 38 y.o. female       HPI     Past Medical  "History:   Diagnosis Date   • ADHD    • Anxiety    • Bipolar 1 disorder (HCC)    • Depression    • PTSD (post-traumatic stress disorder)        History reviewed. No pertinent surgical history.    Current Outpatient Medications   Medication Sig Dispense Refill   • ALPRAZolam (XANAX) 0.5 mg tablet Take 0.5 mg by mouth 2 (two) times a day     • diclofenac sodium (VOLTAREN) 50 mg EC tablet Take 1 tablet (50 mg total) by mouth 2 (two) times a day 60 tablet 0   • gabapentin (NEURONTIN) 800 mg tablet Take 800 mg by mouth 4 (four) times a day     • promethazine (PHENERGAN) 50 MG tablet Take 50 mg by mouth in the morning     • tamsulosin (FLOMAX) 0.4 mg Take 1 capsule (0.4 mg total) by mouth daily with dinner 30 capsule 0   • Vyvanse 40 MG capsule Take 40 mg by mouth every morning     • buprenorphine-naloxone (Suboxone) 8-2 mg Place 8 mg under the tongue 2 (two) times a day (Patient not taking: Reported on 4/23/2024)     • OLANZapine (ZyPREXA) 5 mg tablet Take 5 mg by mouth daily at bedtime     • sertraline (ZOLOFT) 50 mg tablet Take 50 mg by mouth in the morning (Patient not taking: Reported on 4/23/2024)       No current facility-administered medications for this visit.        Allergies   Allergen Reactions   • Codeine Hives   • Doxycycline Hives   • Penicillin V Cough       Review of Systems   Constitutional:  Negative for activity change, appetite change, fatigue and fever.   HENT:  Negative for congestion, rhinorrhea and sore throat.    Eyes:  Negative for visual disturbance.   Respiratory:  Negative for cough and shortness of breath.    Cardiovascular:  Negative for chest pain.   Gastrointestinal:  Negative for nausea and vomiting.   Genitourinary:  Positive for flank pain and hematuria.       Video Exam    Vitals:    04/23/24 1058   Weight: 115 kg (254 lb)   Height: 5' 4\" (1.626 m)       Physical Exam  Constitutional:       Appearance: Normal appearance.   Neurological:      Mental Status: She is alert.          Visit " Time  Total Visit Duration: 10

## 2024-04-24 PROBLEM — N20.0 NEPHROLITHIASIS: Status: ACTIVE | Noted: 2024-04-24

## 2024-04-25 ENCOUNTER — TELEPHONE (OUTPATIENT)
Age: 39
End: 2024-04-25

## 2024-04-25 NOTE — TELEPHONE ENCOUNTER
Patient sent in message and canceled via [employer] this morning for her apt for today due to transportation issues. I did attempt to call patient back to reschedule this apt. Left voicemail on machine for patient to call back and reschedule apt.   
Calm

## 2024-05-14 ENCOUNTER — HOSPITAL ENCOUNTER (EMERGENCY)
Facility: HOSPITAL | Age: 39
Discharge: HOME | End: 2024-05-15
Attending: EMERGENCY MEDICINE
Payer: COMMERCIAL

## 2024-05-14 ENCOUNTER — APPOINTMENT (OUTPATIENT)
Dept: RADIOLOGY | Facility: HOSPITAL | Age: 39
End: 2024-05-14
Payer: COMMERCIAL

## 2024-05-14 VITALS
RESPIRATION RATE: 18 BRPM | HEIGHT: 64 IN | WEIGHT: 243.61 LBS | HEART RATE: 99 BPM | SYSTOLIC BLOOD PRESSURE: 156 MMHG | BODY MASS INDEX: 41.59 KG/M2 | DIASTOLIC BLOOD PRESSURE: 76 MMHG | OXYGEN SATURATION: 99 % | TEMPERATURE: 97.5 F

## 2024-05-14 DIAGNOSIS — F31.9 BIPOLAR 1 DISORDER (HCC): ICD-10-CM

## 2024-05-14 DIAGNOSIS — F41.9 ANXIETY: Primary | ICD-10-CM

## 2024-05-14 LAB
ALBUMIN SERPL BCP-MCNC: 4.3 G/DL (ref 3.5–5)
ALP SERPL-CCNC: 80 U/L (ref 34–104)
ALT SERPL W P-5'-P-CCNC: 33 U/L (ref 7–52)
AMPHETAMINES SERPL QL SCN: POSITIVE
ANION GAP SERPL CALCULATED.3IONS-SCNC: 11 MMOL/L (ref 4–13)
AST SERPL W P-5'-P-CCNC: 51 U/L (ref 13–39)
BACTERIA UR QL AUTO: NORMAL /HPF
BARBITURATES UR QL: NEGATIVE
BASOPHILS # BLD AUTO: 0.02 THOUSANDS/ÂΜL (ref 0–0.1)
BASOPHILS NFR BLD AUTO: 0 % (ref 0–1)
BENZODIAZ UR QL: POSITIVE
BILIRUB SERPL-MCNC: 0.78 MG/DL (ref 0.2–1)
BILIRUB UR QL STRIP: NEGATIVE
BUN SERPL-MCNC: 8 MG/DL (ref 5–25)
CALCIUM SERPL-MCNC: 10 MG/DL (ref 8.4–10.2)
CARDIAC TROPONIN I PNL SERPL HS: <2 NG/L
CHLORIDE SERPL-SCNC: 102 MMOL/L (ref 96–108)
CLARITY UR: ABNORMAL
CO2 SERPL-SCNC: 23 MMOL/L (ref 21–32)
COCAINE UR QL: NEGATIVE
COLOR UR: COLORLESS
CREAT SERPL-MCNC: 0.74 MG/DL (ref 0.6–1.3)
EOSINOPHIL # BLD AUTO: 0.03 THOUSAND/ÂΜL (ref 0–0.61)
EOSINOPHIL NFR BLD AUTO: 0 % (ref 0–6)
ERYTHROCYTE [DISTWIDTH] IN BLOOD BY AUTOMATED COUNT: 12.7 % (ref 11.6–15.1)
FENTANYL UR QL SCN: NEGATIVE
GFR SERPL CREATININE-BSD FRML MDRD: 103 ML/MIN/1.73SQ M
GLUCOSE SERPL-MCNC: 111 MG/DL (ref 65–140)
GLUCOSE UR STRIP-MCNC: NEGATIVE MG/DL
HCT VFR BLD AUTO: 46.5 % (ref 34.8–46.1)
HGB BLD-MCNC: 15.8 G/DL (ref 11.5–15.4)
HGB UR QL STRIP.AUTO: ABNORMAL
HYDROCODONE UR QL SCN: NEGATIVE
IMM GRANULOCYTES # BLD AUTO: 0.01 THOUSAND/UL (ref 0–0.2)
IMM GRANULOCYTES NFR BLD AUTO: 0 % (ref 0–2)
KETONES UR STRIP-MCNC: ABNORMAL MG/DL
LEUKOCYTE ESTERASE UR QL STRIP: NEGATIVE
LYMPHOCYTES # BLD AUTO: 1.12 THOUSANDS/ÂΜL (ref 0.6–4.47)
LYMPHOCYTES NFR BLD AUTO: 15 % (ref 14–44)
MCH RBC QN AUTO: 29.9 PG (ref 26.8–34.3)
MCHC RBC AUTO-ENTMCNC: 34 G/DL (ref 31.4–37.4)
MCV RBC AUTO: 88 FL (ref 82–98)
METHADONE UR QL: NEGATIVE
MONOCYTES # BLD AUTO: 0.3 THOUSAND/ÂΜL (ref 0.17–1.22)
MONOCYTES NFR BLD AUTO: 4 % (ref 4–12)
NEUTROPHILS # BLD AUTO: 5.91 THOUSANDS/ÂΜL (ref 1.85–7.62)
NEUTS SEG NFR BLD AUTO: 81 % (ref 43–75)
NITRITE UR QL STRIP: NEGATIVE
NON-SQ EPI CELLS URNS QL MICRO: NORMAL /HPF
NRBC BLD AUTO-RTO: 0 /100 WBCS
OPIATES UR QL SCN: NEGATIVE
OXYCODONE+OXYMORPHONE UR QL SCN: NEGATIVE
PCP UR QL: NEGATIVE
PH UR STRIP.AUTO: 5.5 [PH]
PLATELET # BLD AUTO: 221 THOUSANDS/UL (ref 149–390)
PMV BLD AUTO: 10.8 FL (ref 8.9–12.7)
POTASSIUM SERPL-SCNC: 3.7 MMOL/L (ref 3.5–5.3)
PROT SERPL-MCNC: 8.4 G/DL (ref 6.4–8.4)
PROT UR STRIP-MCNC: NEGATIVE MG/DL
RBC # BLD AUTO: 5.28 MILLION/UL (ref 3.81–5.12)
RBC #/AREA URNS AUTO: NORMAL /HPF
SODIUM SERPL-SCNC: 136 MMOL/L (ref 135–147)
SP GR UR STRIP.AUTO: 1 (ref 1–1.03)
THC UR QL: NEGATIVE
TSH SERPL DL<=0.05 MIU/L-ACNC: 3.18 UIU/ML (ref 0.45–4.5)
UROBILINOGEN UR STRIP-ACNC: <2 MG/DL
WBC # BLD AUTO: 7.39 THOUSAND/UL (ref 4.31–10.16)
WBC #/AREA URNS AUTO: NORMAL /HPF

## 2024-05-14 PROCEDURE — 99285 EMERGENCY DEPT VISIT HI MDM: CPT

## 2024-05-14 PROCEDURE — 81001 URINALYSIS AUTO W/SCOPE: CPT

## 2024-05-14 PROCEDURE — 36415 COLL VENOUS BLD VENIPUNCTURE: CPT

## 2024-05-14 PROCEDURE — 84443 ASSAY THYROID STIM HORMONE: CPT

## 2024-05-14 PROCEDURE — 71045 X-RAY EXAM CHEST 1 VIEW: CPT

## 2024-05-14 PROCEDURE — 80053 COMPREHEN METABOLIC PANEL: CPT | Performed by: EMERGENCY MEDICINE

## 2024-05-14 PROCEDURE — 93005 ELECTROCARDIOGRAM TRACING: CPT

## 2024-05-14 PROCEDURE — 96360 HYDRATION IV INFUSION INIT: CPT

## 2024-05-14 PROCEDURE — 85025 COMPLETE CBC W/AUTO DIFF WBC: CPT | Performed by: EMERGENCY MEDICINE

## 2024-05-14 PROCEDURE — 84484 ASSAY OF TROPONIN QUANT: CPT | Performed by: EMERGENCY MEDICINE

## 2024-05-14 PROCEDURE — 80307 DRUG TEST PRSMV CHEM ANLYZR: CPT

## 2024-05-14 RX ORDER — ALPRAZOLAM 0.5 MG/1
0.5 TABLET ORAL ONCE
Status: COMPLETED | OUTPATIENT
Start: 2024-05-14 | End: 2024-05-14

## 2024-05-14 RX ADMIN — SODIUM CHLORIDE 1000 ML: 0.9 INJECTION, SOLUTION INTRAVENOUS at 21:02

## 2024-05-14 RX ADMIN — ALPRAZOLAM 0.5 MG: 0.5 TABLET ORAL at 20:56

## 2024-05-15 ENCOUNTER — TELEPHONE (OUTPATIENT)
Dept: PSYCHIATRY | Facility: CLINIC | Age: 39
End: 2024-05-15

## 2024-05-15 LAB
ATRIAL RATE: 122 BPM
P AXIS: 60 DEGREES
PR INTERVAL: 136 MS
QRS AXIS: 77 DEGREES
QRSD INTERVAL: 70 MS
QT INTERVAL: 412 MS
QTC INTERVAL: 587 MS
T WAVE AXIS: 3 DEGREES
VENTRICULAR RATE: 122 BPM

## 2024-05-15 PROCEDURE — 93010 ELECTROCARDIOGRAM REPORT: CPT | Performed by: INTERNAL MEDICINE

## 2024-05-15 RX ORDER — HYDROXYZINE HYDROCHLORIDE 25 MG/1
25 TABLET, FILM COATED ORAL ONCE
Status: COMPLETED | OUTPATIENT
Start: 2024-05-15 | End: 2024-05-15

## 2024-05-15 RX ORDER — HYDROXYZINE HYDROCHLORIDE 25 MG/1
25 TABLET, FILM COATED ORAL EVERY 6 HOURS
Qty: 15 TABLET | Refills: 0 | Status: SHIPPED | OUTPATIENT
Start: 2024-05-15

## 2024-05-15 RX ADMIN — HYDROXYZINE HYDROCHLORIDE 25 MG: 25 TABLET ORAL at 00:54

## 2024-05-15 NOTE — DISCHARGE INSTRUCTIONS
I placed referral to psychiatry.  Please reach out to their service to discuss medication management.  Please call your current psychiatry provider and discuss recent symptoms.  Please do return to ED for new or worsening symptoms.

## 2024-05-15 NOTE — ED PROVIDER NOTES
"History  Chief Complaint   Patient presents with    Personal Problem     Pt reports she feels her neighbors are hacking into her technology at home, states PD were notified 2 months ago but nothing came of it. Pt reports she is \"afraid for my life.\" Pt denies SI/HI. Pt reports palpitations and weight loss because of her nerves      38-year-old female with history of ADHD, anxiety, bipolar 1 disorder, depression, PTSD presents to ED for multiple complaints.  Patient states that the past 4 months she suspects her neighbors have been hacking into her electronics.  States that they have hacked into 2 of her cell phones and have added channels on her television that she did not buy.  States that she feels a electric current throughout her apartment which is causing harm to her health.  Has a rash on her left forearm since the neighbors moved in 4 months ago.  See picture in physical exam portion of note of the rash.  Patient reports that she tried to contact the police about her concerns about her neighbors however the police have not taken the report seriously.  Patient lives with her mom for the past 11 years.  Patient stating that her mom does not want police going through their apartment to investigate any hacking devices.  Patient has tried to remedy the anxiety with Xanax which has been prescribed to her by her psychiatry provider for the past several years.  Of note patient has been taking Vyvanse since September 2023.  Since the symptoms started 4 months ago she has been less consistent in her taking of the Vyvanse due to suspecting her heightened anxiety is due to the Vyvanse.  Patient states that her fear of her neighbors is drastically impairing her ability to sleep.  She used to obtain 8 hours of sleep at night however recently it has been only a couple hours of sleep per night due to frequently waking up.  States that the neighbors are up at night and sleep during the day. Along with hacking her devices, " they bother her with their smell of marijuana and animal feces. Patient unable to complain to her landlord because she fears she will be evicted for not being on her mom's lease. Patient states that she has seen sandhya shaped devices which she suspects are satellites coming out of her walls at times. Patient does not want inpatient admission but wants a place to be for several hours to get away from her neighbors to obtain rest.     Despite these concerns, patient adamantly denies any homicidal or suicidal ideation.  Adamantly denies any current illicit substance abuse.  Used to use cocaine and opioids but stopped 11 years ago per patient. Currently uses suboxone.         Prior to Admission Medications   Prescriptions Last Dose Informant Patient Reported? Taking?   ALPRAZolam (XANAX) 0.5 mg tablet   Yes No   Sig: Take 0.5 mg by mouth 2 (two) times a day   OLANZapine (ZyPREXA) 5 mg tablet   Yes No   Sig: Take 5 mg by mouth daily at bedtime   Vyvanse 40 MG capsule   Yes No   Sig: Take 40 mg by mouth every morning   buprenorphine-naloxone (Suboxone) 8-2 mg   Yes No   Sig: Place 8 mg under the tongue 2 (two) times a day   Patient not taking: Reported on 4/23/2024   diclofenac sodium (VOLTAREN) 50 mg EC tablet   No No   Sig: Take 1 tablet (50 mg total) by mouth 2 (two) times a day   gabapentin (NEURONTIN) 800 mg tablet   Yes No   Sig: Take 800 mg by mouth 4 (four) times a day   promethazine (PHENERGAN) 50 MG tablet   Yes No   Sig: Take 50 mg by mouth in the morning   sertraline (ZOLOFT) 50 mg tablet   Yes No   Sig: Take 50 mg by mouth in the morning   Patient not taking: Reported on 4/23/2024   tamsulosin (FLOMAX) 0.4 mg   No No   Sig: Take 1 capsule (0.4 mg total) by mouth daily with dinner      Facility-Administered Medications: None       Past Medical History:   Diagnosis Date    ADHD     Anxiety     Bipolar 1 disorder (HCC)     Depression     PTSD (post-traumatic stress disorder)        Past Surgical History:    Procedure Laterality Date    ABDOMINAL SURGERY         Family History   Problem Relation Age of Onset    Breast cancer Mother     COPD Mother     Heart attack Father     Mental illness Brother     Pancreatic cancer Maternal Aunt     Lung cancer Paternal Uncle     Breast cancer Cousin      I have reviewed and agree with the history as documented.    E-Cigarette/Vaping    E-Cigarette Use Current Every Day User      E-Cigarette/Vaping Substances    Nicotine Yes     THC No     CBD No     Flavoring No     Other No     Unknown No      Social History     Tobacco Use    Smoking status: Never    Smokeless tobacco: Never   Vaping Use    Vaping status: Every Day    Substances: Nicotine   Substance Use Topics    Alcohol use: Not Currently    Drug use: Not Currently       Review of Systems   Constitutional:  Negative for chills, diaphoresis, fatigue and fever.   HENT:  Negative for ear pain and sore throat.    Eyes:  Negative for pain and visual disturbance.   Respiratory:  Negative for cough, shortness of breath, wheezing and stridor.    Cardiovascular:  Negative for chest pain and palpitations.   Gastrointestinal:  Negative for abdominal pain and vomiting.   Genitourinary:  Negative for dysuria and hematuria.   Musculoskeletal:  Negative for arthralgias and back pain.   Skin:  Negative for color change and rash.   Neurological:  Negative for seizures and syncope.   Psychiatric/Behavioral:  Positive for hallucinations and sleep disturbance. Negative for self-injury and suicidal ideas. The patient is nervous/anxious.    All other systems reviewed and are negative.      Physical Exam  Physical Exam  Vitals and nursing note reviewed.   Constitutional:       General: She is not in acute distress.     Appearance: Normal appearance. She is well-developed. She is not ill-appearing, toxic-appearing or diaphoretic.   HENT:      Head: Normocephalic and atraumatic.      Nose: Nose normal.   Eyes:      Extraocular Movements: Extraocular  movements intact.      Conjunctiva/sclera: Conjunctivae normal.      Pupils: Pupils are equal, round, and reactive to light.   Cardiovascular:      Rate and Rhythm: Regular rhythm. Tachycardia present.      Pulses: Normal pulses.      Heart sounds: Normal heart sounds. No murmur heard.     No friction rub. No gallop.   Pulmonary:      Effort: Pulmonary effort is normal. No respiratory distress.      Breath sounds: Normal breath sounds. No stridor. No wheezing, rhonchi or rales.   Abdominal:      Palpations: Abdomen is soft.      Tenderness: There is no abdominal tenderness.   Musculoskeletal:         General: No swelling.      Cervical back: Neck supple.   Skin:     General: Skin is warm and dry.      Capillary Refill: Capillary refill takes less than 2 seconds.      Comments: Left forearm with small circular lesions as seen in picture.  Nonvesicular, nonpustular, no desquamation, no petechiae, no purpura.   Neurological:      Mental Status: She is alert.   Psychiatric:         Attention and Perception: Attention and perception normal. She is attentive. She does not perceive auditory or visual hallucinations.         Mood and Affect: Affect normal. Mood is anxious. Mood is not elated. Affect is not blunt, angry or inappropriate.         Speech: Speech normal. She is communicative. Speech is not rapid and pressured, delayed, slurred or tangential.         Behavior: Behavior normal. Behavior is not agitated, slowed, aggressive, withdrawn, hyperactive or combative. Behavior is cooperative.         Thought Content: Thought content is paranoid. Thought content is not delusional. Thought content does not include homicidal or suicidal ideation. Thought content does not include homicidal or suicidal plan.         Cognition and Memory: Cognition and memory normal. Cognition is not impaired. Memory is not impaired. She does not exhibit impaired recent memory or impaired remote memory.         Judgment: Judgment normal.  Judgment is not impulsive or inappropriate.           Vital Signs  ED Triage Vitals [05/14/24 1835]   Temperature Pulse Respirations Blood Pressure SpO2   97.5 °F (36.4 °C) (!) 117 18 (!) 173/106 99 %      Temp Source Heart Rate Source Patient Position - Orthostatic VS BP Location FiO2 (%)   Oral Monitor Sitting Left arm --      Pain Score       --           Vitals:    05/14/24 1835 05/14/24 2159 05/14/24 2300   BP: (!) 173/106 160/74 156/76   Pulse: (!) 117 98 99   Patient Position - Orthostatic VS: Sitting Lying          Visual Acuity      ED Medications  Medications   sodium chloride 0.9 % bolus 1,000 mL (0 mL Intravenous Stopped 5/14/24 2202)   ALPRAZolam (XANAX) tablet 0.5 mg (0.5 mg Oral Given 5/14/24 2056)   hydrOXYzine HCL (ATARAX) tablet 25 mg (25 mg Oral Given 5/15/24 0054)       Diagnostic Studies  Results Reviewed       Procedure Component Value Units Date/Time    Rapid drug screen, urine [[id number]]  (Abnormal) Collected: 05/14/24 2216    Lab Status: Final result Specimen: Urine, Clean Catch Updated: 05/14/24 2242     Amph/Meth UR Positive     Barbiturate Ur Negative     Benzodiazepine Urine Positive     Cocaine Urine Negative     Methadone Urine Negative     Opiate Urine Negative     PCP Ur Negative     THC Urine Negative     Oxycodone Urine Negative     Fentanyl Urine Negative     HYDROCODONE URINE Negative    Narrative:      Presumptive report. If requested, specimen will be sent to reference lab for confirmation.  FOR MEDICAL PURPOSES ONLY.   IF CONFIRMATION NEEDED PLEASE CONTACT THE LAB WITHIN 5 DAYS.    Drug Screen Cutoff Levels:  AMPHETAMINE/METHAMPHETAMINES  1000 ng/mL  BARBITURATES     200 ng/mL  BENZODIAZEPINES     200 ng/mL  COCAINE      300 ng/mL  METHADONE      300 ng/mL  OPIATES      300 ng/mL  PHENCYCLIDINE     25 ng/mL  THC       50 ng/mL  OXYCODONE      100 ng/mL  FENTANYL      5 ng/mL  HYDROCODONE     300 ng/mL    Urine Microscopic [[id number]]  (Normal) Collected: 05/14/24 2216    Lab  Status: Final result Specimen: Urine, Clean Catch Updated: 05/14/24 2226     RBC, UA 1-2 /hpf      WBC, UA 1-2 /hpf      Epithelial Cells Occasional /hpf      Bacteria, UA Occasional /hpf     UA (URINE) with reflex to Scope [[id number]]  (Abnormal) Collected: 05/14/24 2216    Lab Status: Final result Specimen: Urine, Clean Catch Updated: 05/14/24 2225     Color, UA Colorless     Clarity, UA Turbid     Specific Gravity, UA 1.004     pH, UA 5.5     Leukocytes, UA Negative     Nitrite, UA Negative     Protein, UA Negative mg/dl      Glucose, UA Negative mg/dl      Ketones, UA 10 (1+) mg/dl      Urobilinogen, UA <2.0 mg/dl      Bilirubin, UA Negative     Occult Blood, UA Moderate    TSH, 3rd generation with Free T4 reflex [[id number]]  (Normal) Collected: 05/14/24 1841    Lab Status: Final result Specimen: Blood from Arm, Right Updated: 05/14/24 2037     TSH 3RD GENERATON 3.180 uIU/mL     HS Troponin 0hr (reflex protocol) [[id number]]  (Normal) Collected: 05/14/24 1841    Lab Status: Final result Specimen: Blood from Arm, Right Updated: 05/14/24 1910     hs TnI 0hr <2 ng/L     Comprehensive metabolic panel [[id number]]  (Abnormal) Collected: 05/14/24 1841    Lab Status: Final result Specimen: Blood from Arm, Right Updated: 05/14/24 1904     Sodium 136 mmol/L      Potassium 3.7 mmol/L      Chloride 102 mmol/L      CO2 23 mmol/L      ANION GAP 11 mmol/L      BUN 8 mg/dL      Creatinine 0.74 mg/dL      Glucose 111 mg/dL      Calcium 10.0 mg/dL      AST 51 U/L      ALT 33 U/L      Alkaline Phosphatase 80 U/L      Total Protein 8.4 g/dL      Albumin 4.3 g/dL      Total Bilirubin 0.78 mg/dL      eGFR 103 ml/min/1.73sq m     Narrative:      National Kidney Disease Foundation guidelines for Chronic Kidney Disease (CKD):     Stage 1 with normal or high GFR (GFR > 90 mL/min/1.73 square meters)    Stage 2 Mild CKD (GFR = 60-89 mL/min/1.73 square meters)    Stage 3A Moderate CKD (GFR = 45-59 mL/min/1.73 square meters)    Stage 3B  Moderate CKD (GFR = 30-44 mL/min/1.73 square meters)    Stage 4 Severe CKD (GFR = 15-29 mL/min/1.73 square meters)    Stage 5 End Stage CKD (GFR <15 mL/min/1.73 square meters)  Note: GFR calculation is accurate only with a steady state creatinine    CBC and differential [[id number]]  (Abnormal) Collected: 05/14/24 1841    Lab Status: Final result Specimen: Blood from Arm, Right Updated: 05/14/24 1847     WBC 7.39 Thousand/uL      RBC 5.28 Million/uL      Hemoglobin 15.8 g/dL      Hematocrit 46.5 %      MCV 88 fL      MCH 29.9 pg      MCHC 34.0 g/dL      RDW 12.7 %      MPV 10.8 fL      Platelets 221 Thousands/uL      nRBC 0 /100 WBCs      Segmented % 81 %      Immature Grans % 0 %      Lymphocytes % 15 %      Monocytes % 4 %      Eosinophils Relative 0 %      Basophils Relative 0 %      Absolute Neutrophils 5.91 Thousands/µL      Absolute Immature Grans 0.01 Thousand/uL      Absolute Lymphocytes 1.12 Thousands/µL      Absolute Monocytes 0.30 Thousand/µL      Eosinophils Absolute 0.03 Thousand/µL      Basophils Absolute 0.02 Thousands/µL                    XR chest 1 view portable   Final Result by Edmund Cespedes MD (05/15 0656)      No acute cardiopulmonary disease.            Workstation performed: [id number]                    Procedures  ECG 12 Lead Documentation Only    Date/Time: 5/14/2024 8:40 PM    Performed by: Abraham Brown PA-C  Authorized by: Abraham Brown PA-C    Indications / Diagnosis:  Tachycardia  Patient location:  ED  Previous ECG:     Previous ECG:  Unavailable  Rate:     ECG rate:  122    ECG rate assessment: tachycardic    Rhythm:     Rhythm: sinus tachycardia    Ectopy:     Ectopy: none    QRS:     QRS axis:  Normal    QRS intervals:  Normal  Conduction:     Conduction: normal    ST segments:     ST segments:  Non-specific  T waves:     T waves: non-specific             ED Course             HEART Risk Score      Flowsheet Row Most Recent Value   Heart Score Risk Calculator     History 0 Filed at: 05/15/2024 0000   ECG 1 Filed at: 05/15/2024 0000   Age 0 Filed at: 05/15/2024 0000   Risk Factors 1 Filed at: 05/15/2024 0000   Troponin 0 Filed at: 05/15/2024 0000   HEART Score 2 Filed at: 05/15/2024 0000                          SBIRT 22yo+      Flowsheet Row Most Recent Value   Initial Alcohol Screen: US AUDIT-C     1. How often do you have a drink containing alcohol? 0 Filed at: 05/14/2024 1841   2. How many drinks containing alcohol do you have on a typical day you are drinking?  0 Filed at: 05/14/2024 1841   3a. Male UNDER 65: How often do you have five or more drinks on one occasion? 0 Filed at: 05/14/2024 1841   3b. FEMALE Any Age, or MALE 65+: How often do you have 4 or more drinks on one occassion? 0 Filed at: 05/14/2024 1841   Audit-C Score 0 Filed at: 05/14/2024 1841   RAMIRO: How many times in the past year have you...    Used an illegal drug or used a prescription medication for non-medical reasons? Never Filed at: 05/14/2024 1841            Wells' Criteria for PE      Flowsheet Row Most Recent Value   Wells' Criteria for PE    Clinical signs and symptoms of DVT 0 Filed at: 05/14/2024 2131   PE is primary diagnosis or equally likely 0 Filed at: 05/14/2024 2131   HR >100 1.5 Filed at: 05/14/2024 2131   Immobilization at least 3 days or Surgery in the previous 4 weeks 0 Filed at: 05/14/2024 2131   Previous, objectively diagnosed PE or DVT 0 Filed at: 05/14/2024 2131   Hemoptysis 0 Filed at: 05/14/2024 2131   Malignancy with treatment within 6 months or palliative 0 Filed at: 05/14/2024 2131   Wells' Criteria Total 1.5 Filed at: 05/14/2024 2131                  Medical Decision Making  Differential diagnosis includes but not limited to anxiety, adverse effect of psychiatric medication, cristobal     38-year-old female presents to ED for evaluation multiple complaints as above.  On physical examination patient slightly hypertensive at 173/106.  This reduced throughout stay in the ED.   Initially tachycardic at 117 bpm.  This reduced well in ED after reduction of stress.  Suspect both of these elevations of vital signs is due to patient anxiety.  No other symptoms or risk factors to suggest ACS, MI, PE.  Nonhypoxic.  No neurodeficits.  Alert and responding to questions appropriately.  Anxious demeanor.  Possibly describing visual hallucinations at her apartment however not describing any current hallucinations in the emergency department.  No murmur.  Normal breath sounds.  Abdomen soft, nontender.  No rash.  Though I suspect patient's current complaints are psychiatry related, did obtain workup consisting of CBC, CMP, serial troponin, TSH with reflex, UA, rapid drug screen.  CBC returned without concerning values.  CMP returned without concerning values.  Troponin less than 2 NG/L.  TSH WNL.  UA without concerning values.  No evidence of UTI.  Rapid drug screen came back positive for amphetamines, benzodiazepines which is consistent with patient's currently prescribed medication regiment of Vyvanse and Xanax.  EKG with tachycardia.  Chest x-ray without evidence of acute cardiopulmonary disease.  Provided patient with dose of her regularly prescribed Xanax.  Monitored patient in the ED for several hours to allow her time to rest away from stressful home environment.  Though the patient presents with possible hallucinations, I do not have reason to believe that she poses threat to self or others.  Denying homicidal and suicidal ideation.  I do have concern that the recently added Vyvanse medication is causing patient to have worsened psychiatric symptoms.  She started this medication this past fall.  Initially it brought her benefit per patient however the increased level of paranoia, decreased sleep quantity and quality, anxiety may be causing some of the complaints about her neighbors. Patient is agreeable to the possibility of this. She understands that her complaints seem a bit odd. I advised  patient to reach out to her prescribing psychiatry provider to discuss her recent symptoms after starting vyvanse. I also feel patient should receive another opinion about her current treatment plan by a psychiatrist. Referral to psychiatry placed with urgent priority. Advised patient to return to ED for new or worsening symptoms. Patient in agreement with plan.     Prior to discharge, discharge instructions were discussed with patient at bedside. Patient was provided both verbal and written instructions. Patient is understanding of the discharge instructions and is agreeable to plan of care. Return precautions were discussed with patient bedside, patient verbalized understanding of signs and symptoms that would necessitate return to the ED. All questions were answered. Patient was comfortable with the plan of care and discharged to home.     Amount and/or Complexity of Data Reviewed  Labs: ordered.  Radiology: ordered.    Risk  Prescription drug management.             Disposition  Final diagnoses:   Anxiety   Bipolar 1 disorder (HCC)     Time reflects when diagnosis was documented in both MDM as applicable and the Disposition within this note       Time User Action Codes Description Comment    5/15/2024 12:35 AM Abraham Brown Add [F41.9] Anxiety     5/15/2024 12:35 AM Abraham Brown Add [F31.9] Bipolar 1 disorder (HCC)           ED Disposition       ED Disposition   Discharge    Condition   Stable    Date/Time   Wed May 15, 2024 0034    Comment   Mariana Messina discharge to home/self care.                   Follow-up Information       Follow up With Specialties Details Why Contact Info    Kenzie Vela MD Family Medicine   [facility] [address] [phone]              Discharge Medication List as of 5/15/2024 12:38 AM        START taking these medications    Details   hydrOXYzine HCL (ATARAX) 25 mg tablet Take 1 tablet (25 mg total) by mouth every 6 (six) hours, Starting Wed  5/15/2024, Normal           CONTINUE these medications which have NOT CHANGED    Details   ALPRAZolam (XANAX) 0.5 mg tablet Take 0.5 mg by mouth 2 (two) times a day, Starting Thu 11/17/2022, Historical Med      buprenorphine-naloxone (Suboxone) 8-2 mg Place 8 mg under the tongue 2 (two) times a day, Starting Wed 11/16/2022, Historical Med      diclofenac sodium (VOLTAREN) 50 mg EC tablet Take 1 tablet (50 mg total) by mouth 2 (two) times a day, Starting Tue 4/23/2024, Normal      gabapentin (NEURONTIN) 800 mg tablet Take 800 mg by mouth 4 (four) times a day, Starting Thu 11/17/2022, Historical Med      OLANZapine (ZyPREXA) 5 mg tablet Take 5 mg by mouth daily at bedtime, Starting Wed 3/27/2024, Historical Med      promethazine (PHENERGAN) 50 MG tablet Take 50 mg by mouth in the morning, Starting Wed 11/16/2022, Historical Med      sertraline (ZOLOFT) 50 mg tablet Take 50 mg by mouth in the morning, Starting Sat 11/5/2022, Historical Med      tamsulosin (FLOMAX) 0.4 mg Take 1 capsule (0.4 mg total) by mouth daily with dinner, Starting Tue 4/23/2024, Normal      Vyvanse 40 MG capsule Take 40 mg by mouth every morning, Starting Wed 3/13/2024, Historical Med                 PDMP Review         Value Time User    PDMP Reviewed  Yes 5/14/2024  9:34 PM Abraham Brown PA-C            ED Provider  Electronically Signed by             Abraham Brown PA-C  05/18/24 0931

## 2024-05-15 NOTE — TELEPHONE ENCOUNTER
Contacted PT. in regards to ASAP/STAT Referral, LVM to contact intake to discuss Services needed at this time.    Pt can be added to [facility] as HIGH priority due to no availability at this time.    Cannot do VV due to Insurance, if Pt wants [facility] please route encounter to Writer.

## 2024-05-16 ENCOUNTER — TELEPHONE (OUTPATIENT)
Dept: PSYCHIATRY | Facility: CLINIC | Age: 39
End: 2024-05-16

## 2024-05-16 NOTE — TELEPHONE ENCOUNTER
Contacted PT. in regards to ASAP/STAT Referral, LVM to contact intake to discuss Services needed at this time.    Pt can be added to [facility] as HIGH priority due to no availability at this time.     Cannot do VV due to Insurance, if Pt wants [facility] please route encounter to Writer.

## 2024-05-20 ENCOUNTER — TELEPHONE (OUTPATIENT)
Dept: PSYCHIATRY | Facility: CLINIC | Age: 39
End: 2024-05-20

## 2024-05-20 NOTE — TELEPHONE ENCOUNTER
Contacted PT. in regards to ASAP/STAT Referral, LVM to contact intake to discuss Services needed at this time.    Pt can be added to [facility] as HIGH priority due to no availability at this time.     Cannot do VV due to Insurance, if Pt wants [facility] please route encounter to Writer.

## 2025-03-12 ENCOUNTER — VBI (OUTPATIENT)
Dept: ADMINISTRATIVE | Facility: OTHER | Age: 40
End: 2025-03-12

## 2025-03-12 NOTE — TELEPHONE ENCOUNTER
03/12/25 2:16 PM     Chart reviewed for Pap Smear (HPV) aka Cervical Cancer Screening ; nothing is submitted to the patient's insurance at this time.     Hui Ramos   [facility]